# Patient Record
(demographics unavailable — no encounter records)

---

## 2024-12-18 NOTE — ASSESSMENT
[FreeTextEntry1] : Charanjit is a 10 year old male with a few episodes of intense abdominal pain without a temporal pattern.  The most likely etiology by description of the pain is intestinal gas and muscle cramping.  We discussed trying a probiotic and benefiber 1-2 teaspoons daily.  A symptom journal is important to better understand the pattern and possibly aggravating factors.  Abdominal migraine was discussed but remains less likely at this time.

## 2024-12-18 NOTE — CONSULT LETTER
[Dear  ___] : Dear  [unfilled], [Consult Letter:] : I had the pleasure of evaluating your patient, [unfilled]. [Please see my note below.] : Please see my note below. [Consult Closing:] : Thank you very much for allowing me to participate in the care of this patient.  If you have any questions, please do not hesitate to contact me. [Sincerely,] : Sincerely, [FreeTextEntry3] : Garett Plaza MD MS [facility]

## 2024-12-18 NOTE — HISTORY OF PRESENT ILLNESS
[de-identified] : Charanjit has had a few sporadic intense abdominal pain episodes in the past 6 months.  One or two initially had an episode of vomiting.  In the past 3 months, he had one episode in September that led to an ER visit where labs (CBC, CMP, CRP, ESR) and appendix ultrasound were unremarkable.  The abdominal pain self resolved and he was discharged home.  He then had an episode one week ago and one yesterday without any vomiting.  Both times pain lasted a few hours and self resolved.  His bowel pattern is regular with no difficulty passing bowel movements and [place] 3 or 4 stool consistency most commonly.  From the September episode until most recently this week, he did not have any abdominal pain and has felt well.  His mother has Crohn's disease.